# Patient Record
Sex: FEMALE | Race: WHITE | NOT HISPANIC OR LATINO | Employment: FULL TIME | URBAN - METROPOLITAN AREA
[De-identification: names, ages, dates, MRNs, and addresses within clinical notes are randomized per-mention and may not be internally consistent; named-entity substitution may affect disease eponyms.]

---

## 2021-04-07 DIAGNOSIS — Z23 ENCOUNTER FOR IMMUNIZATION: ICD-10-CM

## 2023-09-04 ENCOUNTER — OFFICE VISIT (OUTPATIENT)
Dept: URGENT CARE | Facility: CLINIC | Age: 48
End: 2023-09-04
Payer: COMMERCIAL

## 2023-09-04 VITALS
HEIGHT: 63 IN | TEMPERATURE: 99.9 F | RESPIRATION RATE: 16 BRPM | OXYGEN SATURATION: 99 % | WEIGHT: 157 LBS | HEART RATE: 82 BPM | BODY MASS INDEX: 27.82 KG/M2
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
## 2023-09-04 PROCEDURE — G0381 LEV 2 HOSP TYPE B ED VISIT: HCPCS

## 2023-09-04 RX ORDER — ALBUTEROL SULFATE 90 UG/1
AEROSOL, METERED RESPIRATORY (INHALATION)
COMMUNITY
Start: 2023-08-18

## 2023-09-04 RX ORDER — ESCITALOPRAM OXALATE 20 MG/1
TABLET ORAL
COMMUNITY
Start: 2023-08-11

## 2023-09-04 RX ORDER — BUDESONIDE AND FORMOTEROL FUMARATE DIHYDRATE 160; 4.5 UG/1; UG/1
AEROSOL RESPIRATORY (INHALATION)
COMMUNITY
Start: 2023-08-22

## 2023-09-04 RX ORDER — METHYLPREDNISOLONE 4 MG/1
TABLET ORAL
Qty: 1 EACH | Refills: 0 | Status: SHIPPED | OUTPATIENT
Start: 2023-09-04

## 2023-09-04 NOTE — PATIENT INSTRUCTIONS
Take steroids as directed  Continue Zyzal  Keep bite on back of ankle covered and clean  Follow up with PCP in 3-5 days. Proceed to  ER if symptoms worsen.

## 2023-09-04 NOTE — PROGRESS NOTES
[facility]        NAME: Lu Arevalo is a 56 y.o. female  : 1975    MRN: [id number]  DATE: 2023  TIME: 3:58 PM    Assessment and Plan   Bug bite, initial encounter [W57. XXXA]  1. Bug bite, initial encounter  methylPREDNISolone 4 MG tablet therapy pack            Patient Instructions   I discussed that she seems to have had a more significant reaction to whatever bit her. I see no signs of cellulitis. Take steroids as directed  Continue Zyzal  Keep bite on back of ankle covered and clean- follow up if increased redness or purulent drainage. Follow up with PCP in 3-5 days. Proceed to  ER if symptoms worsen. Chief Complaint     Chief Complaint   Patient presents with   • insect bites     Pt reports multiple insect bites that she believes occurred on Saturday. C/o itching, swelling and drainage. Managing symptoms with Benadryl itch cream.          History of Present Illness       HPI  57 y/o female presents for evaluation of bilateral lower extremity bug bites - presumed mosquito- which occurred on Saturday. She states they are very itchy and she has now noted swelling of her right ankle which had her concerned. She states some of the bites are oozing which she believes is due to scratching them while sleeping. She applied Benadryl itch cream without  Relief. Review of Systems   Review of Systems   Constitutional: Negative for chills and fever. HENT: Negative for ear pain and sore throat. Eyes: Negative for pain and visual disturbance. Respiratory: Negative for cough and shortness of breath. Cardiovascular: Negative for chest pain and palpitations. Gastrointestinal: Negative for abdominal pain and vomiting. Genitourinary: Negative for dysuria and hematuria. Musculoskeletal: Negative for arthralgias and back pain. Skin: Positive for rash. Negative for color change. Neurological: Negative for seizures and syncope.    All other systems reviewed and are negative. Current Medications       Current Outpatient Medications:   •  albuterol (PROVENTIL HFA,VENTOLIN HFA) 90 mcg/act inhaler, , Disp: , Rfl:   •  escitalopram (LEXAPRO) 20 mg tablet, , Disp: , Rfl:   •  methylPREDNISolone 4 MG tablet therapy pack, Use as directed on package, Disp: 1 each, Rfl: 0  •  Symbicort 160-4.5 MCG/ACT inhaler, , Disp: , Rfl:     Current Allergies     Allergies as of 09/04/2023   • (No Known Allergies)            The following portions of the patient's history were reviewed and updated as appropriate: allergies, current medications, past family history, past medical history, past social history, past surgical history and problem list.     No past medical history on file. No past surgical history on file. No family history on file. Medications have been verified. Objective   Pulse 82   Temp 99.9 °F (37.7 °C)   Resp 16   Ht 5' 3" (1.6 m)   Wt 71.2 kg (157 lb)   SpO2 99%   BMI 27.81 kg/m²   No LMP recorded. Physical Exam     Physical Exam  Vitals and nursing note reviewed. Constitutional:       General: She is not in acute distress. Appearance: Normal appearance. HENT:      Head: Normocephalic and atraumatic. Cardiovascular:      Rate and Rhythm: Normal rate and regular rhythm. Pulses: Normal pulses. Heart sounds: Normal heart sounds. Pulmonary:      Effort: Pulmonary effort is normal.      Breath sounds: Normal breath sounds. Skin:     General: Skin is warm and dry. Comments: Multiple spots on lower extremities which appear to be bug bites- erythematous, raised papules measuring from 5 mm to 15 mm in diameter. Many have a central superficial erosion - possible from excoriation. One spot on the back of her right ankle has a larger area of excoriation which is seeping serous fluid. Multiple bites around the right ankle have caused mild ankle swelling. No pitting edema.     No signs of cellulitis including warmth or spreading erythema. Neurological:      Mental Status: She is alert and oriented to person, place, and time.    Psychiatric:         Mood and Affect: Mood normal.         Behavior: Behavior normal.

## 2024-12-10 ENCOUNTER — APPOINTMENT (EMERGENCY)
Dept: RADIOLOGY | Facility: HOSPITAL | Age: 49
End: 2024-12-10
Payer: COMMERCIAL

## 2024-12-10 ENCOUNTER — HOSPITAL ENCOUNTER (EMERGENCY)
Facility: HOSPITAL | Age: 49
Discharge: HOME | End: 2024-12-11
Attending: EMERGENCY MEDICINE
Payer: COMMERCIAL

## 2024-12-10 DIAGNOSIS — R07.9 CHEST PAIN: Primary | ICD-10-CM

## 2024-12-10 LAB
ALBUMIN SERPL BCG-MCNC: 3.9 G/DL (ref 3.5–5)
ALP SERPL-CCNC: 31 U/L (ref 34–104)
ALT SERPL W P-5'-P-CCNC: 15 U/L (ref 7–52)
ANION GAP SERPL CALCULATED.3IONS-SCNC: 7 MMOL/L (ref 4–13)
AST SERPL W P-5'-P-CCNC: 13 U/L (ref 13–39)
BASOPHILS # BLD AUTO: 0.08 THOUSANDS/ÂΜL (ref 0–0.1)
BASOPHILS NFR BLD AUTO: 1 % (ref 0–1)
BILIRUB SERPL-MCNC: 0.44 MG/DL (ref 0.2–1)
BUN SERPL-MCNC: 13 MG/DL (ref 5–25)
CALCIUM SERPL-MCNC: 8.3 MG/DL (ref 8.4–10.2)
CARDIAC TROPONIN I PNL SERPL HS: <2 NG/L (ref ?–50)
CHLORIDE SERPL-SCNC: 105 MMOL/L (ref 96–108)
CO2 SERPL-SCNC: 25 MMOL/L (ref 21–32)
CREAT SERPL-MCNC: 0.82 MG/DL (ref 0.6–1.3)
D DIMER PPP FEU-MCNC: 0.41 UG/ML FEU
EOSINOPHIL # BLD AUTO: 0.23 THOUSAND/ÂΜL (ref 0–0.61)
EOSINOPHIL NFR BLD AUTO: 3 % (ref 0–6)
ERYTHROCYTE [DISTWIDTH] IN BLOOD BY AUTOMATED COUNT: 12.4 % (ref 11.6–15.1)
GFR SERPL CREATININE-BSD FRML MDRD: 84 ML/MIN/1.73SQ M
GLUCOSE SERPL-MCNC: 142 MG/DL (ref 65–140)
HCT VFR BLD AUTO: 41.4 % (ref 34.8–46.1)
HGB BLD-MCNC: 13.6 G/DL (ref 11.5–15.4)
IMM GRANULOCYTES # BLD AUTO: 0.04 THOUSAND/UL (ref 0–0.2)
IMM GRANULOCYTES NFR BLD AUTO: 1 % (ref 0–2)
LYMPHOCYTES # BLD AUTO: 3.27 THOUSANDS/ÂΜL (ref 0.6–4.47)
LYMPHOCYTES NFR BLD AUTO: 37 % (ref 14–44)
MCH RBC QN AUTO: 30.8 PG (ref 26.8–34.3)
MCHC RBC AUTO-ENTMCNC: 32.9 G/DL (ref 31.4–37.4)
MCV RBC AUTO: 94 FL (ref 82–98)
MONOCYTES # BLD AUTO: 0.69 THOUSAND/ÂΜL (ref 0.17–1.22)
MONOCYTES NFR BLD AUTO: 8 % (ref 4–12)
NEUTROPHILS # BLD AUTO: 4.53 THOUSANDS/ÂΜL (ref 1.85–7.62)
NEUTS SEG NFR BLD AUTO: 50 % (ref 43–75)
NRBC BLD AUTO-RTO: 0 /100 WBCS
PLATELET # BLD AUTO: 241 THOUSANDS/UL (ref 149–390)
PMV BLD AUTO: 10.4 FL (ref 8.9–12.7)
POTASSIUM SERPL-SCNC: 4.2 MMOL/L (ref 3.5–5.3)
PROT SERPL-MCNC: 6.5 G/DL (ref 6.4–8.4)
RBC # BLD AUTO: 4.41 MILLION/UL (ref 3.81–5.12)
SODIUM SERPL-SCNC: 137 MMOL/L (ref 135–147)
WBC # BLD AUTO: 8.84 THOUSAND/UL (ref 4.31–10.16)

## 2024-12-10 PROCEDURE — 71045 X-RAY EXAM CHEST 1 VIEW: CPT

## 2024-12-10 PROCEDURE — 99285 EMERGENCY DEPT VISIT HI MDM: CPT

## 2024-12-10 PROCEDURE — 93005 ELECTROCARDIOGRAM TRACING: CPT

## 2024-12-10 PROCEDURE — 80053 COMPREHEN METABOLIC PANEL: CPT | Performed by: EMERGENCY MEDICINE

## 2024-12-10 PROCEDURE — 85025 COMPLETE CBC W/AUTO DIFF WBC: CPT | Performed by: EMERGENCY MEDICINE

## 2024-12-10 PROCEDURE — 84484 ASSAY OF TROPONIN QUANT: CPT | Performed by: EMERGENCY MEDICINE

## 2024-12-10 PROCEDURE — 36415 COLL VENOUS BLD VENIPUNCTURE: CPT | Performed by: EMERGENCY MEDICINE

## 2024-12-10 PROCEDURE — 85379 FIBRIN DEGRADATION QUANT: CPT | Performed by: EMERGENCY MEDICINE

## 2024-12-10 NOTE — Clinical Note
Shailesh Soni was seen and treated in our emergency department on 12/10/2024.                Diagnosis:     Shailesh  .    She may return on this date: 12/14/2024         If you have any questions or concerns, please don't hesitate to call.      Juan Kasper, DO    ______________________________           _______________          _______________  Hospital Representative                              Date                                Time

## 2024-12-11 VITALS
DIASTOLIC BLOOD PRESSURE: 68 MMHG | TEMPERATURE: 98.8 F | HEART RATE: 84 BPM | WEIGHT: 155 LBS | HEIGHT: 63 IN | SYSTOLIC BLOOD PRESSURE: 153 MMHG | RESPIRATION RATE: 20 BRPM | OXYGEN SATURATION: 98 % | BODY MASS INDEX: 27.46 KG/M2

## 2024-12-11 PROCEDURE — 99285 EMERGENCY DEPT VISIT HI MDM: CPT | Performed by: EMERGENCY MEDICINE

## 2024-12-11 NOTE — ED PROVIDER NOTES
Time reflects when diagnosis was documented in both MDM as applicable and the Disposition within this note       Time User Action Codes Description Comment    12/11/2024 12:09 AM Juan Kasper Add [R07.9] Chest pain           ED Disposition       ED Disposition   Discharge    Condition   Stable    Date/Time   Wed Dec 11, 2024 12:09 AM    Comment   Shailesh Soni discharge to home/self care.                   Assessment & Plan       Medical Decision Making     Reviewed past medical records: Yes no recent hospitalizations noted     History Provided by: The patient     Differential considered: Arrhythmia, ACS, pneumothorax, costochondritis, anxiety     Consideration of tests: Patient has a heart score of 3, undetectable troponin, D-dimer was ordered as the patient was tachycardic on arrival and could not be cleared by PERC rule.  D-dimer under 0.5.  No further testing indicated.  Reviewed symptomatic management with patient, recommend follow-up with PCP.  Patient agreeable with plan.       I have reviewed the patient's visit and any testing done in the emergency department.  They have verbalized their understanding of any testing done today and have no further questions or concerns regarding their care in the emergency room.  They will follow up with their primary care physician as well as with any specialist in their discharge instructions.  Strict return precautions were discussed.    Amount and/or Complexity of Data Reviewed  Labs: ordered. Decision-making details documented in ED Course.  Radiology: ordered and independent interpretation performed.        ED Course as of 12/11/24 1045   Wed Dec 11, 2024   0008 D-Dimer, Quant: 0.41       Medications - No data to display    ED Risk Strat Scores   HEART Risk Score      Flowsheet Row Most Recent Value   Heart Score Risk Calculator    History 1 Filed at: 12/11/2024 0009   ECG 1 Filed at: 12/11/2024 0009   Age 1 Filed at: 12/11/2024 0009   Risk Factors 0 Filed at:  12/11/2024 0009   Troponin 0 Filed at: 12/11/2024 0009   HEART Score 3 Filed at: 12/11/2024 0009                               SBIRT 22yo+      Flowsheet Row Most Recent Value   Initial Alcohol Screen: US AUDIT-C     1. How often do you have a drink containing alcohol? 0 Filed at: 12/10/2024 2234   2. How many drinks containing alcohol do you have on a typical day you are drinking?  0 Filed at: 12/10/2024 2234   3a. Male UNDER 65: How often do you have five or more drinks on one occasion? 0 Filed at: 12/10/2024 2234   3b. FEMALE Any Age, or MALE 65+: How often do you have 4 or more drinks on one occassion? 0 Filed at: 12/10/2024 2234   Audit-C Score 0 Filed at: 12/10/2024 2234   KIM: How many times in the past year have you...    Used an illegal drug or used a prescription medication for non-medical reasons? Never Filed at: 12/10/2024 2234                            History of Present Illness       Chief Complaint   Patient presents with    Chest Pain     Chest pain L side since yesterday, thought it was a panic attack but now today feeling more like a pressure and radiates to her L side of back. Also reports pain when taking deep breaths, and feel very lightheaded like she could pass out. Notes nausea as well since yesterday.         Past Medical History:   Diagnosis Date    Asthma       History reviewed. No pertinent surgical history.   History reviewed. No pertinent family history.   Social History     Tobacco Use    Smoking status: Never    Smokeless tobacco: Never   Vaping Use    Vaping status: Never Used   Substance Use Topics    Alcohol use: Never    Drug use: Never      E-Cigarette/Vaping    E-Cigarette Use Never User       E-Cigarette/Vaping Substances    Nicotine No     THC No     CBD No     Flavoring No     Other No     Unknown No       I have reviewed and agree with the history as documented.     49-year-old female who presents for concerns of chest pain substernal in nature, no nausea or diaphoresis.   Symptoms do radiate around to her back but not through her chest.  Patient has no past medical history does not smoke no previous cardiac disease.  Does have increased stress.  Never had heart problems in the past.  Patient able to go for runs without becoming short of breath.  Patient states the symptoms do get worse with deep breaths.  Symptoms been ongoing since yesterday,        Review of Systems   Constitutional: Negative.  Negative for chills and fever.   HENT: Negative.  Negative for rhinorrhea, sore throat, trouble swallowing and voice change.    Eyes: Negative.  Negative for pain and visual disturbance.   Respiratory: Negative.  Negative for cough, shortness of breath and wheezing.    Cardiovascular:  Positive for chest pain. Negative for palpitations.   Gastrointestinal:  Negative for abdominal pain, diarrhea, nausea and vomiting.   Genitourinary: Negative.  Negative for dysuria and frequency.   Musculoskeletal: Negative.  Negative for neck pain and neck stiffness.   Skin: Negative.  Negative for rash.   Neurological: Negative.  Negative for dizziness, speech difficulty, weakness, light-headedness and numbness.           Objective       ED Triage Vitals [12/10/24 2230]   Temperature Pulse Blood Pressure Respirations SpO2 Patient Position - Orthostatic VS   98.8 °F (37.1 °C) 105 158/77 20 98 % Lying      Temp Source Heart Rate Source BP Location FiO2 (%) Pain Score    Temporal Monitor Right arm -- 5      Vitals      Date and Time Temp Pulse SpO2 Resp BP Pain Score FACES Pain Rating User   12/11/24 0030 -- 84 98 % 20 153/68 -- -- [place]   12/10/24 2230 98.8 °F (37.1 °C) 105 98 % 20 158/77 5 -- [place]            Physical Exam  Vitals and nursing note reviewed.   Constitutional:       General: She is not in acute distress.     Appearance: She is well-developed.   HENT:      Head: Normocephalic and atraumatic.   Eyes:      Conjunctiva/sclera: Conjunctivae normal.      Pupils: Pupils are equal, round, and reactive to  light.   Neck:      Trachea: No tracheal deviation.   Cardiovascular:      Rate and Rhythm: Normal rate and regular rhythm.   Pulmonary:      Effort: Pulmonary effort is normal. No respiratory distress.      Breath sounds: Normal breath sounds. No wheezing or rales.   Abdominal:      General: Bowel sounds are normal. There is no distension.      Palpations: Abdomen is soft.      Tenderness: There is no abdominal tenderness. There is no guarding or rebound.   Musculoskeletal:         General: No tenderness or deformity. Normal range of motion.      Cervical back: Normal range of motion and neck supple.   Skin:     General: Skin is warm and dry.      Capillary Refill: Capillary refill takes less than 2 seconds.      Findings: No rash.   Neurological:      Mental Status: She is alert and oriented to person, place, and time.   Psychiatric:         Behavior: Behavior normal.         Results Reviewed       Procedure Component Value Units Date/Time    D-dimer, quantitative [[id number]]  (Normal) Collected: 12/10/24 2322    Lab Status: Final result Specimen: Blood from Arm, Right Updated: 12/10/24 2355     D-Dimer, Quant 0.41 ug/ml FEU     HS Troponin 0hr (reflex protocol) [[id number]]  (Normal) Collected: 12/10/24 2249    Lab Status: Final result Specimen: Blood from Arm, Left Updated: 12/10/24 2317     hs TnI 0hr <2 ng/L     Comprehensive metabolic panel [[id number]]  (Abnormal) Collected: 12/10/24 2249    Lab Status: Final result Specimen: Blood from Arm, Left Updated: 12/10/24 2313     Sodium 137 mmol/L      Potassium 4.2 mmol/L      Chloride 105 mmol/L      CO2 25 mmol/L      ANION GAP 7 mmol/L      BUN 13 mg/dL      Creatinine 0.82 mg/dL      Glucose 142 mg/dL      Calcium 8.3 mg/dL      AST 13 U/L      ALT 15 U/L      Alkaline Phosphatase 31 U/L      Total Protein 6.5 g/dL      Albumin 3.9 g/dL      Total Bilirubin 0.44 mg/dL      eGFR 84 ml/min/1.73sq m     Narrative:      [employer] guidelines  for Chronic Kidney Disease (CKD):     Stage 1 with normal or high GFR (GFR > 90 mL/min/1.73 square meters)    Stage 2 Mild CKD (GFR = 60-89 mL/min/1.73 square meters)    Stage 3A Moderate CKD (GFR = 45-59 mL/min/1.73 square meters)    Stage 3B Moderate CKD (GFR = 30-44 mL/min/1.73 square meters)    Stage 4 Severe CKD (GFR = 15-29 mL/min/1.73 square meters)    Stage 5 End Stage CKD (GFR <15 mL/min/1.73 square meters)  Note: GFR calculation is accurate only with a steady state creatinine    CBC and differential [[id number]] Collected: 12/10/24 2249    Lab Status: Final result Specimen: Blood from Arm, Left Updated: 12/10/24 2256     WBC 8.84 Thousand/uL      RBC 4.41 Million/uL      Hemoglobin 13.6 g/dL      Hematocrit 41.4 %      MCV 94 fL      MCH 30.8 pg      MCHC 32.9 g/dL      RDW 12.4 %      MPV 10.4 fL      Platelets 241 Thousands/uL      nRBC 0 /100 WBCs      Segmented % 50 %      Immature Grans % 1 %      Lymphocytes % 37 %      Monocytes % 8 %      Eosinophils Relative 3 %      Basophils Relative 1 %      Absolute Neutrophils 4.53 Thousands/µL      Absolute Immature Grans 0.04 Thousand/uL      Absolute Lymphocytes 3.27 Thousands/µL      Absolute Monocytes 0.69 Thousand/µL      Eosinophils Absolute 0.23 Thousand/µL      Basophils Absolute 0.08 Thousands/µL             XR chest 1 view portable   ED Interpretation by Juan Kasper DO (12/11 0020)   No acute pna or ptx appreciated.      Final Interpretation by Paulino Mckeon MD (12/11 0740)      No acute cardiopulmonary disease.            Workstation performed: [id number]             Procedures    ED Medication and Procedure Management   Prior to Admission Medications   Prescriptions Last Dose Informant Patient Reported? Taking?   Symbicort 160-4.5 MCG/ACT inhaler   Yes No   albuterol (PROVENTIL HFA,VENTOLIN HFA) 90 mcg/act inhaler   Yes No   escitalopram (LEXAPRO) 20 mg tablet   Yes No   methylPREDNISolone 4 MG tablet therapy pack   No No   Sig: Use as  directed on package      Facility-Administered Medications: None     Discharge Medication List as of 12/11/2024 12:20 AM        CONTINUE these medications which have NOT CHANGED    Details   albuterol (PROVENTIL HFA,VENTOLIN HFA) 90 mcg/act inhaler Starting Fri 8/18/2023, Historical Med      escitalopram (LEXAPRO) 20 mg tablet Starting Fri 8/11/2023, Historical Med      methylPREDNISolone 4 MG tablet therapy pack Use as directed on package, Normal      Symbicort 160-4.5 MCG/ACT inhaler Starting Tue 8/22/2023, Historical Med           No discharge procedures on file.  ED SEPSIS DOCUMENTATION   Time reflects when diagnosis was documented in both MDM as applicable and the Disposition within this note       Time User Action Codes Description Comment    12/11/2024 12:09 AM Juan Kasper Add [R07.9] Chest pain                  Juan Kasper DO  12/11/24 1045

## 2024-12-12 LAB
ATRIAL RATE: 94 BPM
P AXIS: 74 DEGREES
PR INTERVAL: 144 MS
QRS AXIS: 74 DEGREES
QRSD INTERVAL: 80 MS
QT INTERVAL: 346 MS
QTC INTERVAL: 432 MS
T WAVE AXIS: 73 DEGREES
VENTRICULAR RATE: 94 BPM

## 2024-12-12 PROCEDURE — 93010 ELECTROCARDIOGRAM REPORT: CPT | Performed by: INTERNAL MEDICINE
